# Patient Record
Sex: MALE | Race: WHITE | NOT HISPANIC OR LATINO | Employment: FULL TIME | URBAN - NONMETROPOLITAN AREA
[De-identification: names, ages, dates, MRNs, and addresses within clinical notes are randomized per-mention and may not be internally consistent; named-entity substitution may affect disease eponyms.]

---

## 2019-03-21 ENCOUNTER — OFFICE VISIT (OUTPATIENT)
Dept: MEDICAL GROUP | Facility: CLINIC | Age: 34
End: 2019-03-21
Payer: COMMERCIAL

## 2019-03-21 VITALS
DIASTOLIC BLOOD PRESSURE: 99 MMHG | TEMPERATURE: 98.3 F | HEIGHT: 70 IN | HEART RATE: 96 BPM | WEIGHT: 244 LBS | BODY MASS INDEX: 34.93 KG/M2 | SYSTOLIC BLOOD PRESSURE: 164 MMHG | OXYGEN SATURATION: 96 %

## 2019-03-21 DIAGNOSIS — S22.080A T12 COMPRESSION FRACTURE (HCC): ICD-10-CM

## 2019-03-21 PROCEDURE — 99202 OFFICE O/P NEW SF 15 MIN: CPT | Performed by: PHYSICIAN ASSISTANT

## 2019-03-21 RX ORDER — IBUPROFEN 800 MG/1
800 TABLET ORAL EVERY 8 HOURS PRN
Qty: 90 TAB | Refills: 1 | Status: SHIPPED | OUTPATIENT
Start: 2019-03-21

## 2019-03-21 RX ORDER — BACLOFEN 10 MG/1
10 TABLET ORAL 2 TIMES DAILY
Qty: 60 TAB | Refills: 0 | Status: SHIPPED | OUTPATIENT
Start: 2019-03-21

## 2019-03-21 ASSESSMENT — PATIENT HEALTH QUESTIONNAIRE - PHQ9: CLINICAL INTERPRETATION OF PHQ2 SCORE: 0
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
## 2019-03-21 NOTE — LETTER
March 25, 2019       Patient: Billy Arias   YOB: 1985   Date of Visit: 3/21/2019         To Whom It May Concern:    It is my medical opinion that Billy Arias cannot participate in defensive tactics due to medical illness.    If you have any questions or concerns, please don't hesitate to call [phone]          Sincerely,          Swati Del Rio P.A.-C.  Electronically Signed

## 2019-03-25 ENCOUNTER — TELEPHONE (OUTPATIENT)
Dept: MEDICAL GROUP | Facility: CLINIC | Age: 34
End: 2019-03-25

## 2019-03-25 DIAGNOSIS — S22.080A T12 COMPRESSION FRACTURE (HCC): ICD-10-CM

## 2019-04-24 ENCOUNTER — OFFICE VISIT (OUTPATIENT)
Dept: MEDICAL GROUP | Facility: CLINIC | Age: 34
End: 2019-04-24
Payer: COMMERCIAL

## 2019-04-24 ENCOUNTER — TELEPHONE (OUTPATIENT)
Dept: MEDICAL GROUP | Facility: CLINIC | Age: 34
End: 2019-04-24

## 2019-04-24 VITALS
DIASTOLIC BLOOD PRESSURE: 98 MMHG | WEIGHT: 244 LBS | TEMPERATURE: 97.9 F | OXYGEN SATURATION: 98 % | HEART RATE: 101 BPM | SYSTOLIC BLOOD PRESSURE: 153 MMHG | HEIGHT: 70 IN | BODY MASS INDEX: 34.93 KG/M2

## 2019-04-24 DIAGNOSIS — J30.2 SEASONAL ALLERGIES: ICD-10-CM

## 2019-04-24 DIAGNOSIS — M85.88 OSTEOPENIA OF LUMBAR SPINE: ICD-10-CM

## 2019-04-24 DIAGNOSIS — G47.01 INSOMNIA DUE TO MEDICAL CONDITION: ICD-10-CM

## 2019-04-24 DIAGNOSIS — B00.9 HERPES SIMPLEX: ICD-10-CM

## 2019-04-24 DIAGNOSIS — S22.080A T12 COMPRESSION FRACTURE (HCC): ICD-10-CM

## 2019-04-24 DIAGNOSIS — J02.9 PHARYNGITIS, UNSPECIFIED ETIOLOGY: ICD-10-CM

## 2019-04-24 PROCEDURE — 99214 OFFICE O/P EST MOD 30 MIN: CPT | Performed by: PHYSICIAN ASSISTANT

## 2019-04-24 RX ORDER — ACYCLOVIR 400 MG/1
TABLET ORAL
Qty: 60 TAB | Refills: 6 | Status: SHIPPED | OUTPATIENT
Start: 2019-04-24

## 2019-04-24 RX ORDER — RAMELTEON 8 MG/1
8 TABLET ORAL NIGHTLY PRN
Qty: 30 TAB | Refills: 2 | Status: SHIPPED | OUTPATIENT
Start: 2019-04-24 | End: 2019-04-24 | Stop reason: SDUPTHER

## 2019-04-24 RX ORDER — AMOXICILLIN 875 MG/1
875 TABLET, COATED ORAL 2 TIMES DAILY
Qty: 20 TAB | Refills: 0 | Status: SHIPPED | OUTPATIENT
Start: 2019-04-24

## 2019-04-24 RX ORDER — RAMELTEON 8 MG/1
8 TABLET ORAL NIGHTLY PRN
Qty: 30 TAB | Refills: 2 | Status: SHIPPED | OUTPATIENT
Start: 2019-04-24

## 2019-04-24 NOTE — PROGRESS NOTES
"cc:  t12 compression fracture    Subjective:     Billy Arias is a 33 y.o. male presenting for t12 compression fracture follow up.      Patient states that Dexa was done in [place] about a week ago.  He feels that his symptoms are bad.  They are okay in the day, but he is not sleeping throughout the night and wakes up at least 10 times a night.      He states that he has also been having congestion and burning in the eyes.  He states the ibuprofen helps throughout the day.  He states that burning of his eyes has been a problem for 3 days and states he cannot touch his eyes.  He denies ear pain and sore throat.  He feels like this may be an allergy issue but is not sure and indicates that he has never had allergies in his past.  However along with this he has had a herpes outbreak as he gets cold sores on his lip on rare occasions.  He has never had treatment for herpes in the past.    Review of systems:  See above.       Current Outpatient Prescriptions:   •  acyclovir (ZOVIRAX) 400 MG tablet, Take one tab 3 times a day for 5 days with outbreak., Disp: 60 Tab, Rfl: 6  •  amoxicillin (AMOXIL) 875 MG tablet, Take 1 Tab by mouth 2 times a day., Disp: 20 Tab, Rfl: 0  •  ramelteon (ROZEREM) 8 MG tablet, Take 1 Tab by mouth at bedtime as needed for Sleep., Disp: 30 Tab, Rfl: 2  •  ibuprofen (MOTRIN) 800 MG Tab, Take 1 Tab by mouth every 8 hours as needed., Disp: 90 Tab, Rfl: 1  •  IBUPROFEN PO, Take  by mouth., Disp: , Rfl:   •  baclofen (LIORESAL) 10 MG Tab, Take 1 Tab by mouth 2 times a day. (Patient not taking: Reported on 4/24/2019), Disp: 60 Tab, Rfl: 0    Allergies, past medical history, past surgical history, family history, social history reviewed and updated    Objective:     Vitals: /98 (BP Location: Left arm, Patient Position: Sitting)   Pulse (!) 101   Temp 36.6 °C (97.9 °F) (Temporal)   Ht 1.778 m (5' 10\")   Wt 110.7 kg (244 lb)   SpO2 98%   BMI 35.01 kg/m²   General: Alert, pleasant, " NAD  HEENT: Normocephalic.  EOMI, no icterus or pallor.  Conjunctivae and lids normal. External ears normal.  Internal ear canals normal.  Oropharynx moderately erythematous, mucous membranes moist.  Herpes simplex type I outbreak upper right lip.  Neck supple.   No cervical or supraclavicular lymphadenopathy.  Heart: Regular rate and rhythm.  S1 and S2 normal.  No murmurs appreciated.  Respiratory: Normal respiratory effort.  Clear to auscultation bilaterally.  Abdomen: Obese  Skin: Warm, dry, no rashes.  Musculoskeletal: Gait is normal.  Moves all extremities well.  Neuro: Cranial nerves II through XII intact.  No focal deficits noted.  Psych:  Affect/mood is normal, judgement is good, memory is intact, grooming is appropriate.    Assessment/Plan:     Billy was seen today for follow-up.    Diagnoses and all orders for this visit:    T12 compression fracture (HCC)  -     REFERRAL TO ORTHOPEDICS  -     ramelteon (ROZEREM) 8 MG tablet; Take 1 Tab by mouth at bedtime as needed for Sleep.    Patient is not sleeping well due to the pain.  Does not want to proceed with narcotic medication at this time.  We will try a sleep medication-Rozerem to see if this will help.  If it does not, may need to consider a medication such as Valium which may help relax the back but also contribute to sleep.  We will follow-up in a month, sooner if needed.  Concerned that patient continues to have pain in the area of the compression fracture but DEXA scan indicates no fracture.  Too early to repeat CT.  Will refer to orthopedics for any recommendations they may have and I am happy to follow-up with the recommendations.    Osteopenia of lumbar spine  -     Comp Metabolic Panel; Future  -     VITAMIN D,25 HYDROXY; Future  -     TSH+FREE T4  -     PTH WITH CALCIUM; Future  -     TESTOSTERONE SERUM; Future  -     IGF-1  Will contact patient as labs were added post visits.  Patient's bone DEXA shows osteopenia which is unusual for patient's  age and sex.  Therefore we will evaluate this further with labs as indicated.    Herpes simplex  -     acyclovir (ZOVIRAX) 400 MG tablet; Take one tab 3 times a day for 5 days with outbreak.  Instructed on use of medication.    Pharyngitis, unspecified etiology  -     amoxicillin (AMOXIL) 875 MG tablet; Take 1 Tab by mouth 2 times a day.    Seasonal allergies  Okay to try Claritin OTC to see if this will help improve symptoms.    Insomnia due to medical condition  Patient is having insomnia due to compression fracture.  Will order Rozerem to see if this will help with symptoms.    No Follow-up on file.    Please note that this dictation was created using voice recognition software. I have made every reasonable attempt to correct obvious errors, but expect that there are errors of grammar and possible content that I did not discover before finalizing note.

## 2019-04-24 NOTE — TELEPHONE ENCOUNTER
Please notify patient that after his office visit I did further research on evaluating the thinning of his bones.  I have ordered a lab work so that we may be able to evaluate further and if possible recommend that he have this done first thing in the morning.  We will check hormone levels calcium and vitamin D levels.

## 2019-04-24 NOTE — TELEPHONE ENCOUNTER
Also as rozerem was denied, we could try a short course of valium as discussed in office.  He would need to come in and sign consents if he would like to try this.

## 2019-04-29 NOTE — TELEPHONE ENCOUNTER
Pt said he is okay with not taking the Valium, states his sleep has been much better and he hasn't been waking up in pain. Pt set up labs for next Tuesday morning. 4/29/19

## 2019-05-07 ENCOUNTER — NON-PROVIDER VISIT (OUTPATIENT)
Dept: MEDICAL GROUP | Facility: CLINIC | Age: 34
End: 2019-05-07
Payer: COMMERCIAL

## 2019-05-07 DIAGNOSIS — M85.88 OSTEOPENIA OF LUMBAR SPINE: ICD-10-CM

## 2019-05-07 PROCEDURE — 36415 COLL VENOUS BLD VENIPUNCTURE: CPT | Performed by: PHYSICIAN ASSISTANT

## 2019-05-07 NOTE — NON-PROVIDER
Billy Arias is a 33 y.o. male here for a non-provider visit for venipuncture.    If abnormal was an in office provider notified today (if so, indicate provider)? Yes  Routed to PCP? Yes